# Patient Record
Sex: MALE
[De-identification: names, ages, dates, MRNs, and addresses within clinical notes are randomized per-mention and may not be internally consistent; named-entity substitution may affect disease eponyms.]

---

## 2022-04-07 PROBLEM — Z00.00 ENCOUNTER FOR PREVENTIVE HEALTH EXAMINATION: Status: ACTIVE | Noted: 2022-04-07

## 2022-04-27 ENCOUNTER — APPOINTMENT (OUTPATIENT)
Dept: NEUROSURGERY | Facility: CLINIC | Age: 77
End: 2022-04-27
Payer: MEDICARE

## 2022-04-27 VITALS — WEIGHT: 203 LBS | BODY MASS INDEX: 28.42 KG/M2 | HEIGHT: 71 IN

## 2022-04-27 DIAGNOSIS — M48.062 SPINAL STENOSIS, LUMBAR REGION WITH NEUROGENIC CLAUDICATION: ICD-10-CM

## 2022-04-27 PROCEDURE — 99203 OFFICE O/P NEW LOW 30 MIN: CPT

## 2022-04-28 NOTE — ASSESSMENT
[FreeTextEntry1] : I discussed the MRI and clinical findings with MR. Hurd in detail.  I believe he has claudication from the L4-5 stenosis.  He would like to exhaust conservative management with more injections.  He will return in a few weeks for follow-up.

## 2022-04-28 NOTE — HISTORY OF PRESENT ILLNESS
[FreeTextEntry1] : low back pain radiating to the legs [de-identified] : Mr. Hurd is a 76 year-old male with a long history of low back pain radiating to the legs.  IT is made worse with ambulation.  He leans forward for relief. \par \par He has had PT which has not helped.\par \par He had 1 NERISSA which has helped.\par \par He reports trace right leg weakness.  No bowel or bladder dysfunction.\par \par MRI shows L4-5 severe stenosis due to facet arthropathy and disc bulging.